# Patient Record
(demographics unavailable — no encounter records)

---

## 2025-01-23 NOTE — ASSESSMENT
[FreeTextEntry1] : We discussed the nature of the underlying pathology and available pain management treatment options. These included interventional, rehabilitative, pharmacological, and complementary modalities. We will proceed with the following:    Interventional treatment options: - Would proceed with bilateral L4-L5, L5-S1 facet joint MBB with fluoroscopic guidance - Will proceed to RFA if adequate relief with diagnostic intervention - Patient is a candidate for L3-L4, L4-L5, L5-S1 BVN ablation (Intracept procedure) for anterior column related axial low back pain; discussed at length - see additional instructions below    Rehabilitative options: - Completed extensive physical therapy trial - Participation in active HEP was discussed and encouraged as tolerated   Medication based treatment options: - Continue OTC NSAIDs on as-needed basis - See additional instructions below    Complementary treatment options: - Weight management and lifestyle modifications discussed   Additional treatment recommendations as follows: - Follow up 1-2 weeks post injection for assessment of efficacy and further treatment recommendations  The risks, benefits and alternatives of the proposed procedure were explained in detail with the patient. The risks outlined include but are not limited to infection, bleeding, post- dural puncture headache, nerve injury, a temporary increase in pain, failure to resolve symptoms, need for future interventions, allergic reaction, and possible elevation of blood sugar in diabetics if using corticosteroid.  All questions were answered to patient's apparent satisfaction, and he/she verbalized an understanding.  Patient presents with axial lumbar pain that has not responded to 3 months of conservative therapy including physical therapy or NSAID therapy.  The pain is interfering with activities of daily living and functionality.  There is no radicular pain.  The pain is exacerbated by facet loading.  Positive Kemps maneuver which is defined by pain reproduction with extension and rotation of the lumbar spine to the affected side.  The patient has not had a vertebral fusion at the levels of the proposed treatment.  There is no unexplained neurologic deficit.  There is no history of systemic infection, unstable medical condition, bleeding tendency, or local infection.  The injection is being performed to diagnose the facet joint as the source of the individual's pain, in preparation for a radiofrequency ablation.  We have discussed the risks, benefits, and alternatives for NSAID therapy including but not limited to the risk of bleeding, thrombosis, gastric mucosal irritation/ulceration, allergic reaction and kidney dysfunction.  The patient was counseled to utilize NSAIDs concurrently with food and/or a PPI if applicable.  They were counseled to use the lowest effective dose for the shortest duration. The patient verbalizes an understanding.  I, Niurka Nair, acting as scribe, attest that this documentation has been prepared under the direction and in the presence of Provider Brian Morgan DO.  The documentation recorded by the scribe, in my presence, accurately reflects the service I personally performed, and the decisions made by me with my edits as appropriate.

## 2025-01-23 NOTE — PHYSICAL EXAM
[de-identified] : Constitutional: - No acute distress - Well developed; well nourished   Neurological: - normal mood and affect - alert and oriented x 3   Cardiovascular: - grossly normal  Lumbar Spine Exam:   Inspection: erythema (-) ecchymosis (-) rashes (-) alignment: no scoliosis   Palpation: Midline lumbar tenderness:            (-) midline thoracic tenderness:          (-) Lumbar paraspinal tenderness:      L (-); R (-) thoracic paraspinal tenderness:     L (-); R (-) sciatic nerve tenderness :              L (-); R (-) SI joint tenderness:                        L (-); R (-) GTB tenderness:                            L (-); R (-)   ROM:  WNL pain with extremes of extension   Strength:                                    Right       Left  Hip Flexion:                (5/5)       (5/5) Quadriceps:               (5/5)       (5/5) Hamstrings:                (5/5)       (5/5) Ankle Dorsiflexion:     (5/5)       (5/5) EHL:                           (5/5)       (5/5) Ankle Plantarflexion:  (5/5)       (5/5)   Special Tests: SLR:                           R (=); L (=) Facet loading:            R (+); L (+) ANNA test:               R (n/a); L (n/a) Hamstring tightness:  R (-); L (-)   Neurologic: SI LT throughout right lower extremity SI LT throughout left lower extremity   Reflexes normal and symmetric bilateral lower extremities   Gait: non- antalgic gait ambulates without assistive device

## 2025-01-23 NOTE — HISTORY OF PRESENT ILLNESS
[Lower back] : lower back [7] : 7 [Dull/Aching] : dull/aching [Constant] : constant [Household chores] : household chores [Leisure] : leisure [Meds] : meds [Sitting] : sitting [Standing] : standing [Walking] : walking [Retired] : Work status: retired [FreeTextEntry1] : 2025 - The patient presents for initial evaluation regarding his low back pain. Patient reports he has pain focal to the center of his lower back. He believes his pain started about two and a half years ago after horseback riding, but he is unsure if this was the specific inciting event. He has had many lumbar ESIs and a facet joint MBB in the past.  His pain is worst in the mornings. Sit-to-stand is difficult. Of note, patient has a history of a right TKA (2023).  Patient would like to discuss candidacy for Intracept today.   Subjective weakness: No Lower extremity paresthesias: No Bladder/bowel dysfunction: No   Injections (Dr. Nanette Gibbs): Yes 1) Left L4-5, L5-S1 TFESI (2023) 2) Left L4-5 TFESI (2023, 3/6/2024) 3) Right L4-5, L5-S1 facet joint MBB (2023, 10/11/2023) 4) Bilateral L4-5 TFESI (2023)   Pertinent Surgical History:  1) Right TKA (2023)   Imagin) MRI Lumbar Spine (2023) - [facility]  2) MRI Lumbar Spine (2023) - [facility]  Physician Disclaimer: I have personally reviewed and confirmed all HPI data with the patient. [] : Post Surgical Visit: no [de-identified] : L MRI

## 2025-01-23 NOTE — PHYSICAL EXAM
[de-identified] : Constitutional: - No acute distress - Well developed; well nourished   Neurological: - normal mood and affect - alert and oriented x 3   Cardiovascular: - grossly normal  Lumbar Spine Exam:   Inspection: erythema (-) ecchymosis (-) rashes (-) alignment: no scoliosis   Palpation: Midline lumbar tenderness:            (-) midline thoracic tenderness:          (-) Lumbar paraspinal tenderness:      L (-); R (-) thoracic paraspinal tenderness:     L (-); R (-) sciatic nerve tenderness :              L (-); R (-) SI joint tenderness:                        L (-); R (-) GTB tenderness:                            L (-); R (-)   ROM:  WNL pain with extremes of extension   Strength:                                    Right       Left  Hip Flexion:                (5/5)       (5/5) Quadriceps:               (5/5)       (5/5) Hamstrings:                (5/5)       (5/5) Ankle Dorsiflexion:     (5/5)       (5/5) EHL:                           (5/5)       (5/5) Ankle Plantarflexion:  (5/5)       (5/5)   Special Tests: SLR:                           R (=); L (=) Facet loading:            R (+); L (+) ANNA test:               R (n/a); L (n/a) Hamstring tightness:  R (-); L (-)   Neurologic: SI LT throughout right lower extremity SI LT throughout left lower extremity   Reflexes normal and symmetric bilateral lower extremities   Gait: non- antalgic gait ambulates without assistive device

## 2025-01-23 NOTE — HISTORY OF PRESENT ILLNESS
[Lower back] : lower back [7] : 7 [Dull/Aching] : dull/aching [Constant] : constant [Household chores] : household chores [Leisure] : leisure [Meds] : meds [Sitting] : sitting [Standing] : standing [Walking] : walking [Retired] : Work status: retired [FreeTextEntry1] : 2025 - The patient presents for initial evaluation regarding his low back pain. Patient reports he has pain focal to the center of his lower back. He believes his pain started about two and a half years ago after horseback riding, but he is unsure if this was the specific inciting event. He has had many lumbar ESIs and a facet joint MBB in the past.  His pain is worst in the mornings. Sit-to-stand is difficult. Of note, patient has a history of a right TKA (2023).  Patient would like to discuss candidacy for Intracept today.   Subjective weakness: No Lower extremity paresthesias: No Bladder/bowel dysfunction: No   Injections (Dr. Nanette Gibbs): Yes 1) Left L4-5, L5-S1 TFESI (2023) 2) Left L4-5 TFESI (2023, 3/6/2024) 3) Right L4-5, L5-S1 facet joint MBB (2023, 10/11/2023) 4) Bilateral L4-5 TFESI (2023)   Pertinent Surgical History:  1) Right TKA (2023)   Imagin) MRI Lumbar Spine (2023) - [facility]  2) MRI Lumbar Spine (2023) - [facility]  Physician Disclaimer: I have personally reviewed and confirmed all HPI data with the patient. [] : Post Surgical Visit: no [de-identified] : L MRI

## 2025-03-26 NOTE — ADDENDUM
[FreeTextEntry1] : 2/18/2025 - MRI dated 2/4/2025 reviewed in detail.  Stable with no change from prior.  Advanced multilevel DDD with Modic changes at L1-S1.  Patient has undergone diagnostic medial branch blocks as well as multiple DEVON's with outside pain physician without any meaningful change.  Intracept procedure was discussed at his initial visit and patient wishes to move forward.  Patient is indicated for L3-L4, L4-L5, L5-S1 BVN ablation with fluoroscopic guidance.                                                                                                                                                                     ****Request authorization/schedule Intracept at (L3, L4, L5, S1) ****   The risks, benefits, and alternatives to the Intracept procedure were discussed with the patient, and they understand and wish to proceed, there are no contraindications to the performance of the procedure.  The patient has had LBP for more than 12 months and has failed multiple structured attempts to resolve the pain for more than 6 months, including (physical therapy, physician-directed home exercise program, lifestyle modification, posture correction, biomechanics education, NSAIDs, chiropractic care, acupuncture, massage therapy, medication).    The MRI demonstrates Modic degenerative endplate changes at (L3, L4, L5, S1). The patient has radiographic evidence of other pathologies besides the Modic changes, however none of them are the predominant cause of the patient's complaints.  Attempts at treating other conditions have not resolved their axial LBP, as such, the predominant cause is due to inflammation of the basivertebral nerve causing vertebrogenic LBP.  The patient's predominant physical complaint is due to Modic changes. The patient's other radiographic findings are not pertinent.   The patient has undergone careful screening by a multi-disciplinary team that includes, the (PCP, the physical therapist, chiropractor, massage therapist, our advanced practice providers) in addition to me.  The patient underwent psychological screening by me and there is no evidence of untreated substance abuse disorder.

## 2025-03-27 NOTE — PHYSICAL EXAM
[de-identified] : Constitutional: - No acute distress - Well developed; well nourished   Neurological: - normal mood and affect - alert and oriented x 3   Cardiovascular: - grossly normal  Lumbar Spine Exam:   Inspection: erythema (-) ecchymosis (-) rashes (-) alignment: no scoliosis well healed trocar insertion sites   Palpation: Midline lumbar tenderness:            (-) midline thoracic tenderness:          (-) Lumbar paraspinal tenderness:      L (-); R (-) thoracic paraspinal tenderness:     L (-); R (-) sciatic nerve tenderness :              L (-); R (-) SI joint tenderness:                        L (-); R (-) GTB tenderness:                            L (-); R (-)   ROM:  WNL pain with extremes of extension   Strength:                                    Right       Left  Hip Flexion:                (5/5)       (5/5) Quadriceps:               (5/5)       (5/5) Hamstrings:                (5/5)       (5/5) Ankle Dorsiflexion:     (5/5)       (5/5) EHL:                           (5/5)       (5/5) Ankle Plantarflexion:  (5/5)       (5/5)   Special Tests: SLR:                           R (-); L (-) Facet loading:            R (+); L (+) ANNA test:               R (n/a); L (n/a) Hamstring tightness:  R (-); L (-)   Neurologic: SI LT throughout right lower extremity SI LT throughout left lower extremity   Reflexes normal and symmetric bilateral lower extremities   Gait: non- antalgic gait

## 2025-03-27 NOTE — ASSESSMENT
[FreeTextEntry1] : We discussed the nature of the underlying pathology and available pain management treatment options. These included interventional, rehabilitative, pharmacological, and complementary modalities. We will proceed with the following:    Interventional treatment options: - none indicated at present time - Patient s/p L3-L4, L4-L5, L5-S1 BVN ablation (Intracept procedure); doing well - see additional instructions below    Rehabilitative options: - Completed extensive physical therapy trial - Participation in active HEP was discussed and encouraged as tolerated   Medication based treatment options: - Continue OTC NSAIDs on as-needed basis - See additional instructions below    Complementary treatment options: - Weight management and lifestyle modifications discussed   Additional treatment recommendations as follows: - Follow up in 3 months or as needed basis  We have discussed the risks, benefits, and alternatives for NSAID therapy including but not limited to the risk of bleeding, thrombosis, gastric mucosal irritation/ulceration, allergic reaction and kidney dysfunction.  The patient was counseled to utilize NSAIDs concurrently with food and/or a PPI if applicable.  They were counseled to use the lowest effective dose for the shortest duration. The patient verbalizes an understanding.  I, Niurka Nair, acting as scribe, attest that this documentation has been prepared under the direction and in the presence of Provider Brian Morgan DO.  The documentation recorded by the scribe, in my presence, accurately reflects the service I personally performed, and the decisions made by me with my edits as appropriate.

## 2025-03-27 NOTE — HISTORY OF PRESENT ILLNESS
[Lower back] : lower back [2] : 2 [0] : 0 [Dull/Aching] : dull/aching [Occasional] : occasional [Leisure] : leisure [Meds] : meds [Injection therapy] : injection therapy [Sitting] : sitting [Walking] : walking [Retired] : Work status: retired [FreeTextEntry1] : 3/27/2025 - Patient presents for FUV after a L3-L4, L4-L5, L5-S1 BVN Ablation (Intracept Procedure) on 3/20/2025. Patient reports 75% reduction in pain overall. He reports significant relief, a near complete resolution of his pain for the first few days following the procedure. His symptoms gradually returned but they are already less intense. He denies any issues after the procedure.  2025 - The patient presents for initial evaluation regarding his low back pain. Patient reports he has pain focal to the center of his lower back. He believes his pain started about two and a half years ago after horseback riding, but he is unsure if this was the specific inciting event. He has had many lumbar ESIs and a facet joint MBB in the past.  His pain is worst in the mornings. Sit-to-stand is difficult. Of note, patient has a history of a right TKA (2023).  Patient would like to discuss candidacy for Intracept today.   Interventional Pain History (Dr. Morgan): 1) L3-L4, L4-L5, L5-S1 BVN Ablation (Intracept Procedure) (3/20/2025)  Injections (Dr. Nanette Gibbs): Yes 1) Left L4-5, L5-S1 TFESI (2023) 2) Left L4-5 TFESI (2023, 3/6/2024) 3) Right L4-5, L5-S1 facet joint MBB (2023, 10/11/2023) 4) Bilateral L4-5 TFESI (2023)   Pertinent Surgical History:  1) Right TKA (2023)   Imagin) MRI Lumbar Spine (2023) - [facility]  2) MRI Lumbar Spine (2023) - [facility]  Physician Disclaimer: I have personally reviewed and confirmed all HPI data with the patient. [] : Post Surgical Visit: no [FreeTextEntry6] : soreness  [de-identified] : L MRI

## 2025-03-27 NOTE — HISTORY OF PRESENT ILLNESS
[Lower back] : lower back [2] : 2 [0] : 0 [Dull/Aching] : dull/aching [Occasional] : occasional [Leisure] : leisure [Meds] : meds [Injection therapy] : injection therapy [Sitting] : sitting [Walking] : walking [Retired] : Work status: retired [FreeTextEntry1] : 3/27/2025 - Patient presents for FUV after a L3-L4, L4-L5, L5-S1 BVN Ablation (Intracept Procedure) on 3/20/2025. Patient reports 75% reduction in pain overall. He reports significant relief, a near complete resolution of his pain for the first few days following the procedure. His symptoms gradually returned but they are already less intense. He denies any issues after the procedure.  2025 - The patient presents for initial evaluation regarding his low back pain. Patient reports he has pain focal to the center of his lower back. He believes his pain started about two and a half years ago after horseback riding, but he is unsure if this was the specific inciting event. He has had many lumbar ESIs and a facet joint MBB in the past.  His pain is worst in the mornings. Sit-to-stand is difficult. Of note, patient has a history of a right TKA (2023).  Patient would like to discuss candidacy for Intracept today.   Interventional Pain History (Dr. Morgan): 1) L3-L4, L4-L5, L5-S1 BVN Ablation (Intracept Procedure) (3/20/2025)  Injections (Dr. Nanette Gibbs): Yes 1) Left L4-5, L5-S1 TFESI (2023) 2) Left L4-5 TFESI (2023, 3/6/2024) 3) Right L4-5, L5-S1 facet joint MBB (2023, 10/11/2023) 4) Bilateral L4-5 TFESI (2023)   Pertinent Surgical History:  1) Right TKA (2023)   Imagin) MRI Lumbar Spine (2023) - [facility]  2) MRI Lumbar Spine (2023) - [facility]  Physician Disclaimer: I have personally reviewed and confirmed all HPI data with the patient. [] : Post Surgical Visit: no [FreeTextEntry6] : soreness  [de-identified] : L MRI

## 2025-03-27 NOTE — PHYSICAL EXAM
[de-identified] : Constitutional: - No acute distress - Well developed; well nourished   Neurological: - normal mood and affect - alert and oriented x 3   Cardiovascular: - grossly normal  Lumbar Spine Exam:   Inspection: erythema (-) ecchymosis (-) rashes (-) alignment: no scoliosis well healed trocar insertion sites   Palpation: Midline lumbar tenderness:            (-) midline thoracic tenderness:          (-) Lumbar paraspinal tenderness:      L (-); R (-) thoracic paraspinal tenderness:     L (-); R (-) sciatic nerve tenderness :              L (-); R (-) SI joint tenderness:                        L (-); R (-) GTB tenderness:                            L (-); R (-)   ROM:  WNL pain with extremes of extension   Strength:                                    Right       Left  Hip Flexion:                (5/5)       (5/5) Quadriceps:               (5/5)       (5/5) Hamstrings:                (5/5)       (5/5) Ankle Dorsiflexion:     (5/5)       (5/5) EHL:                           (5/5)       (5/5) Ankle Plantarflexion:  (5/5)       (5/5)   Special Tests: SLR:                           R (-); L (-) Facet loading:            R (+); L (+) ANNA test:               R (n/a); L (n/a) Hamstring tightness:  R (-); L (-)   Neurologic: SI LT throughout right lower extremity SI LT throughout left lower extremity   Reflexes normal and symmetric bilateral lower extremities   Gait: non- antalgic gait